# Patient Record
Sex: FEMALE | Race: WHITE | NOT HISPANIC OR LATINO | URBAN - METROPOLITAN AREA
[De-identification: names, ages, dates, MRNs, and addresses within clinical notes are randomized per-mention and may not be internally consistent; named-entity substitution may affect disease eponyms.]

---

## 2017-05-19 ENCOUNTER — IMPORTED ENCOUNTER (OUTPATIENT)
Dept: URBAN - METROPOLITAN AREA CLINIC 1 | Facility: CLINIC | Age: 62
End: 2017-05-19

## 2017-05-19 PROBLEM — E11.9: Noted: 2017-05-19

## 2017-05-19 PROBLEM — H16.143: Noted: 2017-05-19

## 2017-05-19 PROBLEM — H25.813: Noted: 2017-05-19

## 2017-05-19 PROBLEM — H04.123: Noted: 2017-05-19

## 2017-05-19 PROCEDURE — 92004 COMPRE OPH EXAM NEW PT 1/>: CPT

## 2017-05-19 PROCEDURE — 92015 DETERMINE REFRACTIVE STATE: CPT

## 2017-05-19 NOTE — PATIENT DISCUSSION
1.  DM Type II without sign of diabetic retinopathy and no blot heme on dilated retinal examination today OU No Macular Edema. Discussed the pathophysiology of diabetes and its effect on the eye and risk of blindness. Stressed the importance of strong glucose control. Advised of importance of at least yearly dilated examinations but to contact us immediately for any problems or concerns. 2. Cataract OU: Visually Significant secondary to glare discussed the risks benefits alternatives and limitations of cataract surgery. The patient stated a full understanding and a desire to proceed with the procedure. The patient will need to return for preop appointment with cataract measurements and to have any additional questions answered and start pre-operative eye drops as directed. ***Guarded prognosis OD given prior CK OD. Not MF candidate***  Schedule phaco PCL OD then OSOtherwise follow-up in 6 months for a 10/glare3. JOSE w/ PEK OU- The increase of artificial tears OU to QID were recommended. Cauterized LL's OU.4. H/o CK OD (done at [facility]. Return for an appointment for [place] H&P with Dr. Annmarie Palafox.

## 2017-07-17 ENCOUNTER — IMPORTED ENCOUNTER (OUTPATIENT)
Dept: URBAN - METROPOLITAN AREA CLINIC 1 | Facility: CLINIC | Age: 62
End: 2017-07-17

## 2017-07-17 PROBLEM — H25.811: Noted: 2017-07-17

## 2017-07-17 PROCEDURE — 92136 OPHTHALMIC BIOMETRY: CPT

## 2017-07-17 NOTE — PATIENT DISCUSSION
Cataract OD: Visually Significant secondary to glare discussed the risks benefits alternatives and limitations of cataract surgery. The patient stated a full understanding and a desire to proceed with the procedure. The patient will need to start pre-operative eye drops as directed. Proceed w/ phaco PCL ODPt understands they will need glasses post-op to achieve their best corrected vision. Discussed guarded prognosis OD given past CK OD. RTC as scheduled for sx OD w/ PMG.

## 2017-07-26 ENCOUNTER — IMPORTED ENCOUNTER (OUTPATIENT)
Dept: URBAN - METROPOLITAN AREA CLINIC 1 | Facility: CLINIC | Age: 62
End: 2017-07-26

## 2017-07-27 ENCOUNTER — IMPORTED ENCOUNTER (OUTPATIENT)
Dept: URBAN - METROPOLITAN AREA CLINIC 1 | Facility: CLINIC | Age: 62
End: 2017-07-27

## 2017-07-27 PROBLEM — Z96.1: Noted: 2017-07-27

## 2017-07-27 PROCEDURE — 99024 POSTOP FOLLOW-UP VISIT: CPT

## 2017-07-27 NOTE — PATIENT DISCUSSION
POD#1 CE/IOL Standard w/ Lensx OD doing well. Continue all 3 gtts as prescribed and until gone. Ocuflox TIDDurezol [facility] QDPost op Warnings Reiterated RTC as scheduled

## 2017-08-01 ENCOUNTER — IMPORTED ENCOUNTER (OUTPATIENT)
Dept: URBAN - METROPOLITAN AREA CLINIC 1 | Facility: CLINIC | Age: 62
End: 2017-08-01

## 2017-08-01 PROBLEM — H25.812: Noted: 2017-08-01

## 2017-08-01 PROBLEM — Z96.1: Noted: 2017-08-01

## 2017-08-01 PROCEDURE — 92136 OPHTHALMIC BIOMETRY: CPT

## 2017-08-01 NOTE — PATIENT DISCUSSION
1.  Cataract OS: Visually Significant secondary to glare discussed the risks benefits alternatives and limitations of cataract surgery. The patient stated a full understanding and a desire to proceed with the procedure. The patient will need to return for preop appointment with cataract measurements and to have any additional questions answered and start pre-operative eye drops as directed. Phaco PCL OS (Standard w/ LenSx) Otherwise follow-up2. POW#1  CE/IOL OD (Standard w/ LenSx) doing well. Discontinue OcufloxUse Durezol BID OD till out Use Ilevro Qdaily OD till outReturn for an appointment for Return as scheduled with Dr. Manjit Diaz.

## 2017-08-09 ENCOUNTER — IMPORTED ENCOUNTER (OUTPATIENT)
Dept: URBAN - METROPOLITAN AREA CLINIC 1 | Facility: CLINIC | Age: 62
End: 2017-08-09

## 2017-08-10 ENCOUNTER — IMPORTED ENCOUNTER (OUTPATIENT)
Dept: URBAN - METROPOLITAN AREA CLINIC 1 | Facility: CLINIC | Age: 62
End: 2017-08-10

## 2017-08-10 PROBLEM — Z96.1: Noted: 2017-08-10

## 2017-08-10 PROCEDURE — 99024 POSTOP FOLLOW-UP VISIT: CPT

## 2017-08-10 NOTE — PATIENT DISCUSSION
1. POD#1 CE/IOL OS (Standard w/ LenSx)  doing well. Continue all 3 gtts as prescribed and until gone. Use Durezol BID OS Ilevro Qdaily OS Ocuflox TID OS 2. POW#1  CE/IOL OD (Standard w/ LenSx) doing well.   Use Durezol BID OD till out Use Ilevro Qdaily OD till out F/u as scheduled

## 2017-09-01 ENCOUNTER — IMPORTED ENCOUNTER (OUTPATIENT)
Dept: URBAN - METROPOLITAN AREA CLINIC 1 | Facility: CLINIC | Age: 62
End: 2017-09-01

## 2017-09-01 PROBLEM — Z96.1: Noted: 2017-09-01

## 2017-09-01 PROCEDURE — 99024 POSTOP FOLLOW-UP VISIT: CPT

## 2017-09-01 NOTE — PATIENT DISCUSSION
POM#1 CE/IOL Standard w/ Lensx OU doing well. Continue Lotemax/Durezol/Prednisolone BID until gone. Continue Prolensa/Ilevro/Acular QD until gone. Return for an appointment for a 27 in May with Dr. Chelsy Wilkerson.

## 2018-05-01 ENCOUNTER — IMPORTED ENCOUNTER (OUTPATIENT)
Dept: URBAN - METROPOLITAN AREA CLINIC 1 | Facility: CLINIC | Age: 63
End: 2018-05-01

## 2018-05-01 PROBLEM — H04.123: Noted: 2018-05-01

## 2018-05-01 PROBLEM — E11.9: Noted: 2018-05-01

## 2018-05-01 PROBLEM — H16.143: Noted: 2018-05-01

## 2018-05-01 PROBLEM — Z79.84: Noted: 2018-05-01

## 2018-05-01 PROBLEM — Z96.1: Noted: 2018-05-01

## 2018-05-01 PROCEDURE — 92014 COMPRE OPH EXAM EST PT 1/>: CPT

## 2018-05-01 NOTE — PATIENT DISCUSSION
1.  DM Type II without sign of diabetic retinopathy and no blot heme on dilated retinal examination today OU No Macular Edema: Stable. Discussed the pathophysiology of diabetes and its effect on the eye and risk of blindness. Stressed the importance of strong glucose control. Advised of importance of at least yearly dilated examinations but to contact us immediately for any problems or concerns. 2. Type II diabetes controlled by oral medications. 3.  JOSE w/ increased PEK OU- Progressed OU. Recommend switching to Preservative free artificial tears OU QID were recommended routinely. Cauterized LLs OU. Failed trial of Restasis in past due to redness and burning. If PEK unimproved at next visit will consider retrial of Restasis w/ FML or Bruno Cowart. 4. Pseudophakia OU (Standard w/ LenSx OU)- Doing well. 5. Return for an appointment for a 10/check K in 3-4 months with Dr. Annmarie Palafox.

## 2018-08-07 ENCOUNTER — IMPORTED ENCOUNTER (OUTPATIENT)
Dept: URBAN - METROPOLITAN AREA CLINIC 1 | Facility: CLINIC | Age: 63
End: 2018-08-07

## 2018-08-07 PROBLEM — H16.143: Noted: 2018-08-07

## 2018-08-07 PROBLEM — H04.123: Noted: 2018-08-07

## 2018-08-07 PROBLEM — Z96.1: Noted: 2018-08-07

## 2018-08-07 PROCEDURE — 83861 MICROFLUID ANALY TEARS: CPT

## 2018-08-07 PROCEDURE — 92012 INTRM OPH EXAM EST PATIENT: CPT

## 2018-08-07 NOTE — PATIENT DISCUSSION
1.  JOSE w/ PEK OU- (cauterized OU ) Increase PEK OU despite multiple tear use OU. Tear Lab 300 OD/ 323 OS. The continuation of  PF artificial tears OU QID Routinely were recommended. Patient intolerant to Restasis. Patient to start FML OU BID and Xiidra OU BID (rx sent to pharm)2. Pseudophakia OU - Lensx OU3. Return for an appointment for 6wks/10 with Dr. Sharron Wise.

## 2018-10-05 ENCOUNTER — IMPORTED ENCOUNTER (OUTPATIENT)
Dept: URBAN - METROPOLITAN AREA CLINIC 1 | Facility: CLINIC | Age: 63
End: 2018-10-05

## 2018-10-05 PROBLEM — H04.123: Noted: 2018-10-05

## 2018-10-05 PROBLEM — H16.143: Noted: 2018-10-05

## 2018-10-05 PROCEDURE — 99213 OFFICE O/P EST LOW 20 MIN: CPT

## 2018-10-05 NOTE — PATIENT DISCUSSION
1.  JOSE w/ PEK OU- (LL cauterized OU). Much improved OU Recommend cont Xiidra BID OU and cont FML BID OU until gone. Recommend ATs TID OU routinely 2. Pseudophakia OU - Lensx OU3. H/o DM w/o DR Yareli Saucedo for an appointment in 6 months 27 with Dr. Mehnaz Newton.

## 2019-04-05 ENCOUNTER — IMPORTED ENCOUNTER (OUTPATIENT)
Dept: URBAN - METROPOLITAN AREA CLINIC 1 | Facility: CLINIC | Age: 64
End: 2019-04-05

## 2019-04-05 PROBLEM — E11.9: Noted: 2019-04-05

## 2019-04-05 PROBLEM — Z79.84: Noted: 2019-04-05

## 2019-04-05 PROBLEM — H04.123: Noted: 2019-04-05

## 2019-04-05 PROBLEM — H16.143: Noted: 2019-04-05

## 2019-04-05 PROCEDURE — 92014 COMPRE OPH EXAM EST PT 1/>: CPT

## 2019-04-05 NOTE — PATIENT DISCUSSION
1.  DM Type II (Oral Meds) without sign of diabetic retinopathy and no blot heme on dilated retinal examination today OU No Macular Edema:  Discussed the pathophysiology of diabetes and its effect on the eye and risk of blindness. Stressed the importance of strong glucose control. Advised of importance of at least yearly dilated examinations but to contact us immediately for any problems or concerns. 3. JOSE w/ PEK OU- H/o Cautery LLs OU. Cont PF ATs QID OU Routinely Cont Xiidra BID OU restart Flarex Qdaily OU (use 1 gtt prior to Doraine Shell application to reduce burning) Rx sent to pt's pharmacy. 4.  H/o CK OD (Done at [facility] to PCP Patient deferred Manifest Rx today. Return for an appointment in 6 mo 10 with Dr. Florecita Rivera.

## 2019-10-14 ENCOUNTER — IMPORTED ENCOUNTER (OUTPATIENT)
Dept: URBAN - METROPOLITAN AREA CLINIC 1 | Facility: CLINIC | Age: 64
End: 2019-10-14

## 2019-10-14 PROBLEM — H04.123: Noted: 2019-10-14

## 2019-10-14 PROBLEM — Z96.1: Noted: 2019-10-14

## 2019-10-14 PROBLEM — H16.143: Noted: 2019-10-14

## 2019-10-14 PROCEDURE — 99213 OFFICE O/P EST LOW 20 MIN: CPT

## 2019-10-14 NOTE — PATIENT DISCUSSION
1.  JOSE w/ PEK OU- (LL cauterized OU) Cont PF ATs QID OU Routinely Cont Xiidra BID OU Flarex Qdaily OU2. Pseudophakia OU - (LenSx OU) 3. H/o DM w/o  OU 4. H/o CK OD (Done at [facility] for an appointment in 6 months 30 with Dr. Mike Reddy.

## 2020-08-03 ENCOUNTER — IMPORTED ENCOUNTER (OUTPATIENT)
Dept: URBAN - METROPOLITAN AREA CLINIC 1 | Facility: CLINIC | Age: 65
End: 2020-08-03

## 2020-08-03 PROBLEM — H04.123: Noted: 2020-08-03

## 2020-08-03 PROBLEM — H26.493: Noted: 2020-08-03

## 2020-08-03 PROBLEM — Z79.84: Noted: 2020-08-03

## 2020-08-03 PROBLEM — H16.143: Noted: 2020-08-03

## 2020-08-03 PROBLEM — E11.9: Noted: 2020-08-03

## 2020-08-03 PROBLEM — Z96.1: Noted: 2020-08-03

## 2020-08-03 PROCEDURE — 92014 COMPRE OPH EXAM EST PT 1/>: CPT

## 2020-08-03 NOTE — PATIENT DISCUSSION
1.  DM Type II (Oral Med) without sign of diabetic retinopathy and no blot heme on dilated retinal examination today OU No Macular Edema:  Discussed the pathophysiology of diabetes and its effect on the eye and risk of blindness. Stressed the importance of strong glucose control. Advised of importance of at least yearly dilated examinations but to contact us immediately for any problems or concerns. 2. JOSE w/ PEK OU -- (LL cauterized OU) Increased PEK noted OU today. Cont PF ATs QID OU Routinely Cont Xiidra BID OU Flarex Qdaily OU. Begin AT desi QHS OU (Handout given). 3.  PCO OU -- (Posterior Capsule Opacification)   Observe and consider yag cap when pt feels pco visually significant and visual acuity decreases to appropriate level. 4. Pseudophakia OU -- (LenSx OU) 5. H/o CK OD (Done at [facility] for an appointment in 6 months 10/k check with Dr. Emily White.

## 2021-02-16 ENCOUNTER — IMPORTED ENCOUNTER (OUTPATIENT)
Dept: URBAN - METROPOLITAN AREA CLINIC 1 | Facility: CLINIC | Age: 66
End: 2021-02-16

## 2021-02-16 PROBLEM — H04.123: Noted: 2021-02-16

## 2021-02-16 PROBLEM — H16.143: Noted: 2021-02-16

## 2021-02-16 PROCEDURE — 99213 OFFICE O/P EST LOW 20 MIN: CPT

## 2021-02-16 NOTE — PATIENT DISCUSSION
Pseudophakia OU - (LenSx OU) 3. H/o DM w/o DR OU 4.   H/o CK OD (Done at [facility])

## 2021-02-16 NOTE — PATIENT DISCUSSION
1.  JOSE w/ PEK OU- (LL cauterized OU) Continue Xiidra BID OU and Flarex QD OU. Recommend PF ATs Q1-2H OU routinely and AT Gel/[place] QHS OU 2. Pseudophakia OU - (LenSx OU) 3. H/o DM w/o DR OU 4. H/o CK OD (Done at [facility] for an appointment in 6 months 30 with Dr. Lisy Figueroa.

## 2021-08-19 NOTE — PATIENT DISCUSSION
POSTERIOR CAPSULAR FIBROSIS/OPACIFICATION, OU - NOT VISUALLY SIGNIFICANT. NEW GLS RX GIVEN TO FILL IF DESIRES. FOLLOW AT THIS TIME.

## 2021-08-24 ENCOUNTER — IMPORTED ENCOUNTER (OUTPATIENT)
Dept: URBAN - METROPOLITAN AREA CLINIC 1 | Facility: CLINIC | Age: 66
End: 2021-08-24

## 2021-08-24 ENCOUNTER — PREPPED CHART (OUTPATIENT)
Dept: URBAN - METROPOLITAN AREA CLINIC 1 | Facility: CLINIC | Age: 66
End: 2021-08-24

## 2021-08-24 PROBLEM — H16.143: Noted: 2021-08-24

## 2021-08-24 PROBLEM — Z79.4: Noted: 2021-08-24

## 2021-08-24 PROBLEM — H04.123: Noted: 2021-08-24

## 2021-08-24 PROBLEM — E11.9: Noted: 2021-08-24

## 2021-08-24 PROCEDURE — 99214 OFFICE O/P EST MOD 30 MIN: CPT

## 2021-08-24 NOTE — PATIENT DISCUSSION
1.  DM Type II (Insulin) without sign of diabetic retinopathy and no blot heme on dilated retinal examination today OU No Macular Edema:  Discussed the pathophysiology of diabetes and its effect on the eye and risk of blindness. Stressed the importance of strong glucose control. Advised of importance of at least yearly dilated examinations but to contact us immediately for any problems or concerns. 2. JOSE w/ PEK OU- (LL cauterized OU)  DC Flarex Qdaily OU. Will DC Annella Dom due to cost. Recommend increase PF ATs Q1H OU Routinely Refresh Celluvisc QHS-BID OU and AT desi QHS OU **Allergy to Restasis 3. PCO OU - (Posterior Capsule Opacification)   Observe and consider yag cap when pt feels pco visually significant and visual acuity decreases to appropriate level. 4. Pseudophakia OU - (LenSx OU) 5. H/o CK OD (Done at [facility]) Patient deferred Manifest Rx today. Return for an appointment in 6 months 10 (check ks on PF ATs only) with Dr. Lela Merino.

## 2022-02-24 ENCOUNTER — FOLLOW UP (OUTPATIENT)
Dept: URBAN - METROPOLITAN AREA CLINIC 1 | Facility: CLINIC | Age: 67
End: 2022-02-24

## 2022-02-24 DIAGNOSIS — H04.123: ICD-10-CM

## 2022-02-24 DIAGNOSIS — H16.143: ICD-10-CM

## 2022-02-24 PROCEDURE — 99213 OFFICE O/P EST LOW 20 MIN: CPT

## 2022-02-24 ASSESSMENT — TONOMETRY
OS_IOP_MMHG: 12
OD_IOP_MMHG: 12
OD_IOP_MMHG: 15
OS_IOP_MMHG: 12

## 2022-02-24 ASSESSMENT — VISUAL ACUITY
OD_SC: 20/25
OD_SC: 20/20
OS_SC: 20/20
OS_SC: 20/20

## 2022-02-24 NOTE — PATIENT DISCUSSION
no improvement on PF ATs alone. Allergy to Restasis. PT had discontinued Xiidra due to cost. Cont AT gel QHS OU.

## 2022-02-24 NOTE — PATIENT DISCUSSION
Start Xiidra BID OU (samples given). Davian Bridges is going to check insurance and see if PA had been done.) Will change to generic Restasis if coverage too expensive.

## 2022-04-02 ASSESSMENT — VISUAL ACUITY
OS_CC: 20/20
OD_CC: 20/20
OD_CC: 20/20
OS_CC: 20/20
OD_CC: 20/25+2
OD_CC: 20/20
OD_CC: 20/20
OD_CC: 20/30
OS_GLARE: 20/200
OD_CC: 20/25
OD_GLARE: 20/200
OS_CC: 20/20
OS_CC: 20/25
OS_CC: 20/20
OD_SC: 20/20
OD_CC: 20/20
OD_SC: 20/20
OD_CC: 20/25+2
OS_SC: 20/50
OS_CC: 20/20
OS_GLARE: 20/200
OS_CC: 20/25-1
OD_GLARE: 20/200
OD_CC: 20/40
OD_CC: 20/25
OS_CC: 20/20
OD_CC: 20/20
OS_SC: 20/50
OS_CC: 20/20
OS_CC: 20/20

## 2022-04-02 ASSESSMENT — KERATOMETRY
OD_AXISANGLE2_DEGREES: 105
OD_K2POWER_DIOPTERS: 46.00
OS_AXISANGLE2_DEGREES: 102
OS_AXISANGLE_DEGREES: 012
OS_K2POWER_DIOPTERS: 45.5
OD_K1POWER_DIOPTERS: 44.50
OS_K1POWER_DIOPTERS: 45.50
OD_AXISANGLE_DEGREES: 015

## 2022-04-02 ASSESSMENT — TONOMETRY
OD_IOP_MMHG: 12
OD_IOP_MMHG: 12
OD_IOP_MMHG: 11
OS_IOP_MMHG: 13
OD_IOP_MMHG: 11
OS_IOP_MMHG: 12
OS_IOP_MMHG: 12
OD_IOP_MMHG: 12
OS_IOP_MMHG: 13
OS_IOP_MMHG: 12
OD_IOP_MMHG: 13
OS_IOP_MMHG: 12
OS_IOP_MMHG: 11
OD_IOP_MMHG: 11
OD_IOP_MMHG: 10
OD_IOP_MMHG: 10
OD_IOP_MMHG: 12
OS_IOP_MMHG: 11
OS_IOP_MMHG: 12
OS_IOP_MMHG: 11
OD_IOP_MMHG: 12
OS_IOP_MMHG: 13
OD_IOP_MMHG: 13
OD_IOP_MMHG: 12
OS_IOP_MMHG: 12
OD_IOP_MMHG: 12

## 2022-05-26 ENCOUNTER — FOLLOW UP (OUTPATIENT)
Dept: URBAN - METROPOLITAN AREA CLINIC 1 | Facility: CLINIC | Age: 67
End: 2022-05-26

## 2022-05-26 DIAGNOSIS — H04.123: ICD-10-CM

## 2022-05-26 DIAGNOSIS — H16.143: ICD-10-CM

## 2022-05-26 PROCEDURE — 92012 INTRM OPH EXAM EST PATIENT: CPT

## 2022-05-26 ASSESSMENT — TONOMETRY
OS_IOP_MMHG: 12
OD_IOP_MMHG: 12

## 2022-05-26 ASSESSMENT — VISUAL ACUITY
OD_SC: 20/25
OS_SC: 20/20

## 2022-05-26 NOTE — PATIENT DISCUSSION
Improved on PF ATs alone. PT had discontinued Xiidra due to cost. Cont AT gel QHS OU. *Allergy to Restasis.

## 2023-02-02 NOTE — PATIENT DISCUSSION
Pseudophakia OU - Lensx OU3. H/o DM w/o DR Shirin Benites for an appointment in 6 months 27 with Dr. Lina Garcia. Knee pain

## 2023-08-28 ENCOUNTER — FOLLOW UP (OUTPATIENT)
Dept: URBAN - METROPOLITAN AREA CLINIC 1 | Facility: CLINIC | Age: 68
End: 2023-08-28

## 2023-08-28 DIAGNOSIS — H04.123: ICD-10-CM

## 2023-08-28 PROCEDURE — 99213 OFFICE O/P EST LOW 20 MIN: CPT

## 2023-08-28 ASSESSMENT — VISUAL ACUITY
OD_SC: 20/20-2
OS_SC: 20/20

## 2023-08-28 ASSESSMENT — TONOMETRY
OS_IOP_MMHG: 14
OD_IOP_MMHG: 14

## 2024-03-25 ENCOUNTER — COMPREHENSIVE EXAM (OUTPATIENT)
Dept: URBAN - METROPOLITAN AREA CLINIC 1 | Facility: CLINIC | Age: 69
End: 2024-03-25

## 2024-03-25 DIAGNOSIS — H26.493: ICD-10-CM

## 2024-03-25 DIAGNOSIS — Z79.4: ICD-10-CM

## 2024-03-25 DIAGNOSIS — H16.143: ICD-10-CM

## 2024-03-25 DIAGNOSIS — H04.123: ICD-10-CM

## 2024-03-25 DIAGNOSIS — E11.9: ICD-10-CM

## 2024-03-25 PROCEDURE — 99214 OFFICE O/P EST MOD 30 MIN: CPT

## 2024-03-25 ASSESSMENT — VISUAL ACUITY
OD_SC: 20/25
OS_SC: 20/20

## 2024-03-25 ASSESSMENT — TONOMETRY
OD_IOP_MMHG: 14
OS_IOP_MMHG: 14

## 2024-06-14 ENCOUNTER — EMERGENCY VISIT (OUTPATIENT)
Dept: URBAN - METROPOLITAN AREA CLINIC 1 | Facility: CLINIC | Age: 69
End: 2024-06-14

## 2024-06-14 DIAGNOSIS — H35.61: ICD-10-CM

## 2024-06-14 DIAGNOSIS — H43.811: ICD-10-CM

## 2024-06-14 PROCEDURE — 99213 OFFICE O/P EST LOW 20 MIN: CPT

## 2024-06-14 ASSESSMENT — VISUAL ACUITY
OD_SC: 20/25
OS_SC: 20/25

## 2024-06-14 ASSESSMENT — TONOMETRY
OD_IOP_MMHG: 14
OS_IOP_MMHG: 14

## 2024-08-02 ENCOUNTER — FOLLOW UP (OUTPATIENT)
Dept: URBAN - METROPOLITAN AREA CLINIC 1 | Facility: CLINIC | Age: 69
End: 2024-08-02

## 2024-08-02 DIAGNOSIS — H43.811: ICD-10-CM

## 2024-08-02 DIAGNOSIS — H35.61: ICD-10-CM

## 2024-08-02 PROCEDURE — 99213 OFFICE O/P EST LOW 20 MIN: CPT

## 2024-08-02 ASSESSMENT — VISUAL ACUITY
OS_SC: 20/20-1
OU_CC: 20/20
OD_SC: 20/20-1

## 2024-08-02 ASSESSMENT — TONOMETRY
OD_IOP_MMHG: 12
OS_IOP_MMHG: 12

## 2025-02-06 ENCOUNTER — COMPREHENSIVE EXAM (OUTPATIENT)
Age: 70
End: 2025-02-06

## 2025-02-06 DIAGNOSIS — Z79.4: ICD-10-CM

## 2025-02-06 DIAGNOSIS — H04.123: ICD-10-CM

## 2025-02-06 DIAGNOSIS — H01.024: ICD-10-CM

## 2025-02-06 DIAGNOSIS — H43.811: ICD-10-CM

## 2025-02-06 DIAGNOSIS — E11.9: ICD-10-CM

## 2025-02-06 DIAGNOSIS — Z96.1: ICD-10-CM

## 2025-02-06 DIAGNOSIS — H16.143: ICD-10-CM

## 2025-02-06 DIAGNOSIS — H01.021: ICD-10-CM

## 2025-02-06 DIAGNOSIS — H26.493: ICD-10-CM

## 2025-02-06 DIAGNOSIS — H35.61: ICD-10-CM

## 2025-02-06 PROCEDURE — 92015 DETERMINE REFRACTIVE STATE: CPT

## 2025-02-06 PROCEDURE — 99214 OFFICE O/P EST MOD 30 MIN: CPT

## 2025-02-28 ENCOUNTER — CLINIC PROCEDURE ONLY (OUTPATIENT)
Age: 70
End: 2025-02-28

## 2025-02-28 DIAGNOSIS — H26.493: ICD-10-CM

## 2025-02-28 DIAGNOSIS — Z96.1: ICD-10-CM

## 2025-02-28 PROCEDURE — 66821 AFTER CATARACT LASER SURGERY: CPT

## 2025-03-14 ENCOUNTER — CLINIC PROCEDURE ONLY (OUTPATIENT)
Age: 70
End: 2025-03-14

## 2025-03-14 DIAGNOSIS — Z96.1: ICD-10-CM

## 2025-03-14 DIAGNOSIS — H26.492: ICD-10-CM

## 2025-03-14 PROCEDURE — 66821 AFTER CATARACT LASER SURGERY: CPT | Mod: 79,LT

## 2025-04-17 ENCOUNTER — POST-OP (OUTPATIENT)
Age: 70
End: 2025-04-17

## 2025-04-17 DIAGNOSIS — Z96.1: ICD-10-CM

## 2025-04-17 DIAGNOSIS — Z98.890: ICD-10-CM
